# Patient Record
Sex: FEMALE | Race: WHITE | NOT HISPANIC OR LATINO | Employment: FULL TIME | URBAN - NONMETROPOLITAN AREA
[De-identification: names, ages, dates, MRNs, and addresses within clinical notes are randomized per-mention and may not be internally consistent; named-entity substitution may affect disease eponyms.]

---

## 2019-12-07 ENCOUNTER — HOSPITAL ENCOUNTER (EMERGENCY)
Facility: HOSPITAL | Age: 69
Discharge: HOME | End: 2019-12-07
Attending: EMERGENCY MEDICINE | Admitting: EMERGENCY MEDICINE
Payer: COMMERCIAL

## 2019-12-07 VITALS
DIASTOLIC BLOOD PRESSURE: 62 MMHG | HEART RATE: 72 BPM | SYSTOLIC BLOOD PRESSURE: 132 MMHG | RESPIRATION RATE: 20 BRPM | BODY MASS INDEX: 21.71 KG/M2 | OXYGEN SATURATION: 97 % | WEIGHT: 118 LBS | HEIGHT: 62 IN | TEMPERATURE: 98.6 F

## 2019-12-07 DIAGNOSIS — K64.9 HEMORRHOIDS, UNSPECIFIED HEMORRHOID TYPE: Primary | ICD-10-CM

## 2019-12-07 LAB
ALBUMIN SERPL BCP-MCNC: 3.8 G/DL (ref 3.5–5)
ALP SERPL-CCNC: 80 U/L (ref 46–116)
ALT SERPL W P-5'-P-CCNC: 29 U/L (ref 12–78)
ANION GAP SERPL CALCULATED.3IONS-SCNC: 6 MMOL/L (ref 4–13)
APTT PPP: 26 SECONDS (ref 23–37)
AST SERPL W P-5'-P-CCNC: 24 U/L (ref 5–45)
BASOPHILS # BLD AUTO: 0.03 THOUSANDS/ΜL (ref 0–0.1)
BASOPHILS NFR BLD AUTO: 1 % (ref 0–1)
BILIRUB SERPL-MCNC: 0.4 MG/DL (ref 0.2–1)
BUN SERPL-MCNC: 13 MG/DL (ref 5–25)
CALCIUM SERPL-MCNC: 9.3 MG/DL (ref 8.3–10.1)
CHLORIDE SERPL-SCNC: 104 MMOL/L (ref 100–108)
CO2 SERPL-SCNC: 31 MMOL/L (ref 21–32)
CREAT SERPL-MCNC: 0.58 MG/DL (ref 0.6–1.3)
EOSINOPHIL # BLD AUTO: 0.1 THOUSAND/ΜL (ref 0–0.61)
EOSINOPHIL NFR BLD AUTO: 2 % (ref 0–6)
ERYTHROCYTE [DISTWIDTH] IN BLOOD BY AUTOMATED COUNT: 12.7 % (ref 11.6–15.1)
GFR SERPL CREATININE-BSD FRML MDRD: 95 ML/MIN/1.73SQ M
GLUCOSE SERPL-MCNC: 107 MG/DL (ref 65–140)
HCT VFR BLD AUTO: 44.1 % (ref 34.8–46.1)
HGB BLD-MCNC: 14.4 G/DL (ref 11.5–15.4)
IMM GRANULOCYTES # BLD AUTO: 0.02 THOUSAND/UL (ref 0–0.2)
IMM GRANULOCYTES NFR BLD AUTO: 0 % (ref 0–2)
INR PPP: 0.86 (ref 0.84–1.19)
LYMPHOCYTES # BLD AUTO: 1.71 THOUSANDS/ΜL (ref 0.6–4.47)
LYMPHOCYTES NFR BLD AUTO: 27 % (ref 14–44)
MCH RBC QN AUTO: 31.4 PG (ref 26.8–34.3)
MCHC RBC AUTO-ENTMCNC: 32.7 G/DL (ref 31.4–37.4)
MCV RBC AUTO: 96 FL (ref 82–98)
MONOCYTES # BLD AUTO: 0.58 THOUSAND/ΜL (ref 0.17–1.22)
MONOCYTES NFR BLD AUTO: 9 % (ref 4–12)
NEUTROPHILS # BLD AUTO: 3.85 THOUSANDS/ΜL (ref 1.85–7.62)
NEUTS SEG NFR BLD AUTO: 61 % (ref 43–75)
NRBC BLD AUTO-RTO: 0 /100 WBCS
PLATELET # BLD AUTO: 263 THOUSANDS/UL (ref 149–390)
PMV BLD AUTO: 9.3 FL (ref 8.9–12.7)
POTASSIUM SERPL-SCNC: 5 MMOL/L (ref 3.5–5.3)
PROT SERPL-MCNC: 7.4 G/DL (ref 6.4–8.2)
PROTHROMBIN TIME: 11.7 SECONDS (ref 11.6–14.5)
RBC # BLD AUTO: 4.59 MILLION/UL (ref 3.81–5.12)
SODIUM SERPL-SCNC: 141 MMOL/L (ref 136–145)
WBC # BLD AUTO: 6.29 THOUSAND/UL (ref 4.31–10.16)

## 2019-12-07 PROCEDURE — 36415 COLL VENOUS BLD VENIPUNCTURE: CPT | Performed by: EMERGENCY MEDICINE

## 2019-12-07 PROCEDURE — 80053 COMPREHEN METABOLIC PANEL: CPT | Performed by: EMERGENCY MEDICINE

## 2019-12-07 PROCEDURE — 85025 COMPLETE CBC W/AUTO DIFF WBC: CPT | Performed by: EMERGENCY MEDICINE

## 2019-12-07 PROCEDURE — 85610 PROTHROMBIN TIME: CPT | Performed by: EMERGENCY MEDICINE

## 2019-12-07 PROCEDURE — 99283 EMERGENCY DEPT VISIT LOW MDM: CPT

## 2019-12-07 PROCEDURE — 99284 EMERGENCY DEPT VISIT MOD MDM: CPT | Performed by: EMERGENCY MEDICINE

## 2019-12-07 PROCEDURE — 85730 THROMBOPLASTIN TIME PARTIAL: CPT | Performed by: EMERGENCY MEDICINE

## 2019-12-07 NOTE — ED PROVIDER NOTES
History  Chief Complaint   Patient presents with    Rectal Bleeding - Minor     pt is with blood in stool  pt states she has a chronic problem with blood in the stool  pt states she has a history of hemmoroids and having abd problems  pt states she feels pressure still at the base of her colon     Patient complains of rectal bleeding x2 today  Feels pressure in her rectal area  Does have a history of hemorrhoids  No fevers or chills  States her stomach has been upset since Thanksgiving and has been on a light diet  Stools are slightly soft now  No recent travel  No recent antibiotics  No weakness or lightheadedness  No abdominal pain  History provided by:  Patient   used: No    Rectal Bleeding - Minor   Quality:  Bright red  Amount: Moderate  Duration: this morning for 2 episodes  Chronicity:  Recurrent  Context: hemorrhoids    Similar prior episodes: yes    Relieved by:  Nothing  Worsened by:  Nothing  Ineffective treatments:  None tried  Associated symptoms: no abdominal pain, no dizziness, no fever, no recent illness and no vomiting    Risk factors: no anticoagulant use        None       Past Medical History:   Diagnosis Date    Arthritis     GERD (gastroesophageal reflux disease)     Psychiatric disorder     anxiety       History reviewed  No pertinent surgical history  History reviewed  No pertinent family history  I have reviewed and agree with the history as documented  Social History     Tobacco Use    Smoking status: Smoker, Current Status Unknown    Smokeless tobacco: Never Used   Substance Use Topics    Alcohol use: Not Currently    Drug use: Never        Review of Systems   Constitutional: Negative for chills and fever  HENT: Negative for ear pain, hearing loss, sore throat, trouble swallowing and voice change  Eyes: Negative for pain and discharge  Respiratory: Negative for cough, shortness of breath and wheezing      Cardiovascular: Negative for chest pain and palpitations  Gastrointestinal: Positive for blood in stool and hematochezia  Negative for abdominal pain, constipation, diarrhea, nausea and vomiting  Genitourinary: Negative for dysuria, flank pain, frequency and hematuria  Musculoskeletal: Negative for joint swelling, neck pain and neck stiffness  Skin: Negative for rash and wound  Neurological: Negative for dizziness, seizures, syncope, facial asymmetry and headaches  Psychiatric/Behavioral: Negative for hallucinations, self-injury and suicidal ideas  All other systems reviewed and are negative  Physical Exam  Physical Exam   Constitutional: She is oriented to person, place, and time  She appears well-developed and well-nourished  No distress  HENT:   Head: Normocephalic and atraumatic  Right Ear: External ear normal    Left Ear: External ear normal    Eyes: Pupils are equal, round, and reactive to light  Conjunctivae and EOM are normal    Neck: Normal range of motion  Neck supple  Cardiovascular: Normal rate, regular rhythm and normal heart sounds  No murmur heard  Pulmonary/Chest: Effort normal and breath sounds normal  She has no wheezes  She has no rales  Abdominal: Soft  Bowel sounds are normal  She exhibits no distension  There is no tenderness  There is no rebound and no guarding  On rectal exam there are 2 enlarged hemorrhoids  One hemorrhoid is inflamed and tender to palpation with fresh blood on it  Heme-positive  No stool in the vault  Musculoskeletal: Normal range of motion  She exhibits no deformity  Neurological: She is alert and oriented to person, place, and time  No cranial nerve deficit  Skin: Skin is warm and dry  No rash noted  Psychiatric: She has a normal mood and affect  Her behavior is normal    Nursing note and vitals reviewed        Vital Signs  ED Triage Vitals [12/07/19 0917]   Temperature Pulse Respirations Blood Pressure SpO2   98 6 °F (37 °C) 82 18 (!) 191/77 98 %      Temp Source Heart Rate Source Patient Position - Orthostatic VS BP Location FiO2 (%)   Temporal Monitor Lying Left arm --      Pain Score       3           Vitals:    12/07/19 0917 12/07/19 0939   BP: (!) 191/77 134/61   Pulse: 82    Patient Position - Orthostatic VS: Lying          Visual Acuity      ED Medications  Medications - No data to display    Diagnostic Studies  Results Reviewed     Procedure Component Value Units Date/Time    Comprehensive metabolic panel [[id number]]  (Abnormal) Collected:  12/07/19 0937    Lab Status:  Final result Specimen:  Blood from Arm, Right Updated:  12/07/19 1004     Sodium 141 mmol/L      Potassium 5 0 mmol/L      Chloride 104 mmol/L      CO2 31 mmol/L      ANION GAP 6 mmol/L      BUN 13 mg/dL      Creatinine 0 58 mg/dL      Glucose 107 mg/dL      Calcium 9 3 mg/dL      AST 24 U/L      ALT 29 U/L      Alkaline Phosphatase 80 U/L      Total Protein 7 4 g/dL      Albumin 3 8 g/dL      Total Bilirubin 0 40 mg/dL      eGFR 95 ml/min/1 73sq m     Narrative:       [place] guidelines for Chronic Kidney Disease (CKD):     Stage 1 with normal or high GFR (GFR > 90 mL/min/1 73 square meters)    Stage 2 Mild CKD (GFR = 60-89 mL/min/1 73 square meters)    Stage 3A Moderate CKD (GFR = 45-59 mL/min/1 73 square meters)    Stage 3B Moderate CKD (GFR = 30-44 mL/min/1 73 square meters)    Stage 4 Severe CKD (GFR = 15-29 mL/min/1 73 square meters)    Stage 5 End Stage CKD (GFR <15 mL/min/1 73 square meters)  Note: GFR calculation is accurate only with a steady state creatinine    Protime-INR [[id number]]  (Normal) Collected:  12/07/19 0937    Lab Status:  Final result Specimen:  Blood from Arm, Right Updated:  12/07/19 0954     Protime 11 7 seconds      INR 0 86    APTT [[id number]]  (Normal) Collected:  12/07/19 0937    Lab Status:  Final result Specimen:  Blood from Arm, Right Updated:  12/07/19 0954     PTT 26 seconds     CBC and differential [[id number]] Collected: 12/07/19 0937    Lab Status:  Final result Specimen:  Blood from Arm, Right Updated:  12/07/19 0942     WBC 6 29 Thousand/uL      RBC 4 59 Million/uL      Hemoglobin 14 4 g/dL      Hematocrit 44 1 %      MCV 96 fL      MCH 31 4 pg      MCHC 32 7 g/dL      RDW 12 7 %      MPV 9 3 fL      Platelets 135 Thousands/uL      nRBC 0 /100 WBCs      Neutrophils Relative 61 %      Immat GRANS % 0 %      Lymphocytes Relative 27 %      Monocytes Relative 9 %      Eosinophils Relative 2 %      Basophils Relative 1 %      Neutrophils Absolute 3 85 Thousands/µL      Immature Grans Absolute 0 02 Thousand/uL      Lymphocytes Absolute 1 71 Thousands/µL      Monocytes Absolute 0 58 Thousand/µL      Eosinophils Absolute 0 10 Thousand/µL      Basophils Absolute 0 03 Thousands/µL                  No orders to display              Procedures  Procedures         ED Course                               MDM      Disposition  Final diagnoses:   Hemorrhoids, unspecified hemorrhoid type     Time reflects when diagnosis was documented in both MDM as applicable and the Disposition within this note     Time User Action Codes Description Comment    12/7/2019  9:34 AM Felicity Voss Add [K64 9] Hemorrhoids, unspecified hemorrhoid type       ED Disposition     ED Disposition Condition Date/Time Comment    Discharge Stable Sat Dec 7, 2019 10:05 AM Sonny Dong discharge to home/self care              Follow-up Information    None         Patient's Medications   Discharge Prescriptions    HYDROCORTISONE (ANUSOL-HC) 2 5 % RECTAL CREAM    Apply rectally 3 times daily       Start Date: 12/7/2019 End Date: --       Order Dose: --       Quantity: 30 g    Refills: 0         ED Provider  Electronically Signed by           Catalino Pink MD  12/07/19 1006

## 2021-03-09 DIAGNOSIS — Z23 ENCOUNTER FOR IMMUNIZATION: ICD-10-CM

## 2021-03-19 ENCOUNTER — IMMUNIZATIONS (OUTPATIENT)
Dept: FAMILY MEDICINE CLINIC | Facility: HOSPITAL | Age: 71
End: 2021-03-19

## 2021-03-19 DIAGNOSIS — Z23 ENCOUNTER FOR IMMUNIZATION: Primary | ICD-10-CM

## 2021-03-19 PROCEDURE — 91300 SARS-COV-2 / COVID-19 MRNA VACCINE (PFIZER-BIONTECH) 30 MCG: CPT

## 2021-03-19 PROCEDURE — 0001A SARS-COV-2 / COVID-19 MRNA VACCINE (PFIZER-BIONTECH) 30 MCG: CPT

## 2021-04-12 ENCOUNTER — IMMUNIZATIONS (OUTPATIENT)
Dept: FAMILY MEDICINE CLINIC | Facility: HOSPITAL | Age: 71
End: 2021-04-12

## 2021-04-12 DIAGNOSIS — Z23 ENCOUNTER FOR IMMUNIZATION: Primary | ICD-10-CM

## 2021-04-12 PROCEDURE — 0002A SARS-COV-2 / COVID-19 MRNA VACCINE (PFIZER-BIONTECH) 30 MCG: CPT

## 2021-04-12 PROCEDURE — 91300 SARS-COV-2 / COVID-19 MRNA VACCINE (PFIZER-BIONTECH) 30 MCG: CPT

## 2021-07-11 ENCOUNTER — HOSPITAL ENCOUNTER (EMERGENCY)
Facility: HOSPITAL | Age: 71
Discharge: HOME | End: 2021-07-11
Attending: EMERGENCY MEDICINE
Payer: COMMERCIAL

## 2021-07-11 VITALS
OXYGEN SATURATION: 97 % | BODY MASS INDEX: 20.73 KG/M2 | SYSTOLIC BLOOD PRESSURE: 135 MMHG | WEIGHT: 113.32 LBS | TEMPERATURE: 97.8 F | DIASTOLIC BLOOD PRESSURE: 67 MMHG | RESPIRATION RATE: 18 BRPM | HEART RATE: 95 BPM

## 2021-07-11 DIAGNOSIS — S05.01XA ABRASION OF RIGHT CORNEA, INITIAL ENCOUNTER: Primary | ICD-10-CM

## 2021-07-11 PROCEDURE — 99283 EMERGENCY DEPT VISIT LOW MDM: CPT

## 2021-07-11 PROCEDURE — 99284 EMERGENCY DEPT VISIT MOD MDM: CPT | Performed by: EMERGENCY MEDICINE

## 2021-07-11 RX ORDER — CIPROFLOXACIN HYDROCHLORIDE 3.5 MG/ML
1 SOLUTION/ DROPS TOPICAL EVERY 4 HOURS
Qty: 5 ML | Refills: 0 | Status: SHIPPED | OUTPATIENT
Start: 2021-07-11

## 2021-07-11 RX ORDER — POLYVINYL ALCOHOL 14 MG/ML
1 SOLUTION/ DROPS OPHTHALMIC AS NEEDED
COMMUNITY

## 2021-07-11 RX ADMIN — FLUORESCEIN SODIUM 1 STRIP: 1 STRIP OPHTHALMIC at 07:40

## 2021-07-11 NOTE — DISCHARGE INSTRUCTIONS
Although I was not able to visualize any obvious scratches, you may have a small scratch of your cornea  There are no foreign objects in your eye  Your symptoms are consistent with chemical irritation of the eye or small scratch  Please follow-up with 1 of the eye doctors listed below  Use the prescribed antibiotic drops

## 2021-07-11 NOTE — ED PROVIDER NOTES
History  Chief Complaint   Patient presents with    Eye Problem     pt complains of right eye irritation x few days, feels like something in eyes, using OTC drops and had eye burning this morning with drop  Patient complains of 2 days of right eye irritation  She states that she felt like something got in my eye  Denies visual change, fevers, drainage from the eye  States she has been using artificial tear drops and over the past day these have started to burn  History provided by:  Patient   used: No    Eye Problem  Location:  Right eye  Quality:  Burning  Severity:  Mild  Onset quality:  Gradual  Duration:  2 days  Timing:  Constant  Progression:  Unchanged  Chronicity:  New  Context: foreign body    Context: not contact lens problem    Relieved by:  Nothing  Worsened by:  Nothing  Ineffective treatments:  Eye drops  Associated symptoms: no blurred vision, no crusting, no decreased vision, no discharge, no double vision, no headaches, no nausea, no numbness, no photophobia, no redness, no scotomas, no swelling, no tearing, no tingling and no vomiting        Prior to Admission Medications   Prescriptions Last Dose Informant Patient Reported? Taking?   hydrocortisone (ANUSOL-HC) 2 5 % rectal cream Not Taking at Unknown time  No No   Sig: Apply rectally 3 times daily   Patient not taking: Reported on 2021   polyvinyl alcohol (LIQUIFILM TEARS) 1 4 % ophthalmic solution   Yes Yes   Si drop as needed for dry eyes      Facility-Administered Medications: None       Past Medical History:   Diagnosis Date    Arthritis     GERD (gastroesophageal reflux disease)     Psychiatric disorder     anxiety       History reviewed  No pertinent surgical history  History reviewed  No pertinent family history  I have reviewed and agree with the history as documented      E-Cigarette/Vaping     E-Cigarette/Vaping Substances     Social History     Tobacco Use    Smoking status: Current Every Day Smoker     Packs/day: 0 50    Smokeless tobacco: Never Used   Substance Use Topics    Alcohol use: Not Currently    Drug use: Never       Review of Systems   Constitutional: Negative for chills and fever  HENT: Negative for ear pain, hearing loss, sore throat, trouble swallowing and voice change  Eyes: Negative for blurred vision, double vision, photophobia, pain, discharge and redness  Respiratory: Negative for cough, shortness of breath and wheezing  Cardiovascular: Negative for chest pain and palpitations  Gastrointestinal: Negative for abdominal pain, blood in stool, constipation, diarrhea, nausea and vomiting  Genitourinary: Negative for dysuria, flank pain, frequency and hematuria  Musculoskeletal: Negative for joint swelling, neck pain and neck stiffness  Skin: Negative for rash and wound  Neurological: Negative for dizziness, tingling, seizures, syncope, facial asymmetry, numbness and headaches  Psychiatric/Behavioral: Negative for hallucinations, self-injury and suicidal ideas  All other systems reviewed and are negative  Physical Exam  Physical Exam  Vitals and nursing note reviewed  Constitutional:       General: She is not in acute distress  Appearance: She is well-developed  She is not ill-appearing or diaphoretic  HENT:      Head: Normocephalic and atraumatic  Right Ear: External ear normal       Left Ear: External ear normal    Eyes:      General: No scleral icterus  Right eye: No discharge  Left eye: No discharge  Extraocular Movements: Extraocular movements intact  Conjunctiva/sclera: Conjunctivae normal       Pupils: Pupils are equal, round, and reactive to light  Comments: Right eye without obvious injection or chemosis  Pupil is reactive  Extraocular movements are full  Lid iram it and no foreign bodies noted  No corneal or scleral foreign bodies noted    Eye examined with fluorescein staining and Wood's lamp, no corneal abrasions or corneal ulcerations noted  Lazaro sign negative   Pulmonary:      Effort: Pulmonary effort is normal  No respiratory distress  Musculoskeletal:         General: No deformity or signs of injury  Normal range of motion  Cervical back: Normal range of motion and neck supple  Skin:     General: Skin is warm and dry  Coloration: Skin is not jaundiced or pale  Neurological:      General: No focal deficit present  Mental Status: She is alert and oriented to person, place, and time  Cranial Nerves: No cranial nerve deficit  Coordination: Coordination normal       Gait: Gait normal    Psychiatric:         Mood and Affect: Mood normal          Behavior: Behavior normal          Thought Content: Thought content normal          Judgment: Judgment normal          Vital Signs  ED Triage Vitals   Temperature Pulse Respirations Blood Pressure SpO2   07/11/21 0739 07/11/21 0739 07/11/21 0739 07/11/21 0739 07/11/21 0745   97 8 °F (36 6 °C) 95 18 (!) 185/77 97 %      Temp Source Heart Rate Source Patient Position - Orthostatic VS BP Location FiO2 (%)   07/11/21 0739 07/11/21 0739 07/11/21 0739 07/11/21 0739 --   Temporal Monitor Sitting Left arm       Pain Score       07/11/21 0745       7           Vitals:    07/11/21 0739 07/11/21 0745   BP: (!) 185/77 135/67   Pulse: 95    Patient Position - Orthostatic VS: Sitting Sitting         Visual Acuity      ED Medications  Medications   fluorescein sodium sterile ophthalmic strip 1 strip (1 strip Right Eye Given by Other 7/11/21 0740)       Diagnostic Studies  Results Reviewed     None                 No orders to display              Procedures  Procedures         ED Course                             SBIRT 20yo+      Most Recent Value   SBIRT (23 yo +)   In order to provide better care to our patients, we are screening all of our patients for alcohol and drug use  Would it be okay to ask you these screening questions?   Unable to answer at this time Filed at: 07/11/2021 0779                    [facility]  Number of Diagnoses or Management Options  Diagnosis management comments: Possible chemical irritation of the eye versus small nonvisualized abrasion  I advised patient have prescribed antibiotic eyedrops  I advised patient she should follow-up with Ophthalmology and I have provided outpatient referral information  Patient is agreeable  Disposition  Final diagnoses:   Abrasion of right cornea, initial encounter     Time reflects when diagnosis was documented in both MDM as applicable and the Disposition within this note     Time User Action Codes Description Comment    7/11/2021  7:49 AM Tawnya Woodward Add [S05 01XA] Abrasion of right cornea, initial encounter       ED Disposition     ED Disposition Condition Date/Time Comment    Discharge Stable Sun Jul 11, 2021  7:49 AM Sabino Owens discharge to home/self care  Follow-up Information     Follow up With Specialties Details Why Contact Info    [facility]   [address]  [address]    [facility]  [phone]          Patient's Medications   Discharge Prescriptions    CIPROFLOXACIN (CILOXAN) 0 3 % OPHTHALMIC SOLUTION    Administer 1 drop to the right eye every 4 (four) hours       Start Date: 7/11/2021 End Date: --       Order Dose: 1 drop       Quantity: 5 mL    Refills: 0     No discharge procedures on file      PDMP Review     None          ED Provider  Electronically Signed by           Narinder Haro MD  07/11/21 5636

## 2021-07-11 NOTE — ED NOTES
Pt in no acute distress  Ambulates with a steady gait   Verbalizes understanding of discharge instructions       Rosemary oRbbins RN  07/11/21 6997

## 2024-12-27 ENCOUNTER — OFFICE VISIT (OUTPATIENT)
Dept: URGENT CARE | Facility: CLINIC | Age: 74
End: 2024-12-27
Payer: COMMERCIAL

## 2024-12-27 VITALS
TEMPERATURE: 97.2 F | DIASTOLIC BLOOD PRESSURE: 82 MMHG | RESPIRATION RATE: 16 BRPM | OXYGEN SATURATION: 94 % | HEIGHT: 62 IN | BODY MASS INDEX: 20.24 KG/M2 | WEIGHT: 110 LBS | SYSTOLIC BLOOD PRESSURE: 142 MMHG | HEART RATE: 82 BPM

## 2024-12-27 DIAGNOSIS — H57.89 IRRITATION OF RIGHT EYE: Primary | ICD-10-CM

## 2024-12-27 PROCEDURE — 99213 OFFICE O/P EST LOW 20 MIN: CPT

## 2024-12-27 PROCEDURE — S9088 SERVICES PROVIDED IN URGENT: HCPCS

## 2024-12-27 RX ORDER — TETRACAINE HYDROCHLORIDE 5 MG/ML
2 SOLUTION OPHTHALMIC ONCE
Status: COMPLETED | OUTPATIENT
Start: 2024-12-27 | End: 2024-12-27

## 2024-12-27 RX ORDER — LORATADINE 10 MG/1
10 TABLET ORAL DAILY
COMMUNITY
End: 2024-12-27

## 2024-12-27 RX ORDER — ERYTHROMYCIN 5 MG/G
0.5 OINTMENT OPHTHALMIC
Qty: 7 G | Refills: 0 | Status: SHIPPED | OUTPATIENT
Start: 2024-12-27 | End: 2025-01-03

## 2024-12-27 RX ADMIN — TETRACAINE HYDROCHLORIDE 2 DROP: 5 SOLUTION OPHTHALMIC at 10:00

## 2024-12-27 NOTE — PROGRESS NOTES
[facility]        NAME: Velvet Hussein is a 74 y.o. female  : 1950    MRN: [id number]  DATE: 2024  TIME: 11:55 AM    Assessment and Plan   Irritation of right eye [H57.89]  1. Irritation of right eye  tetracaine 0.5 % ophthalmic solution 2 drop    fluorescein sodium sterile 1 mg ophthalmic strip 1 strip    erythromycin (ILOTYCIN) ophthalmic ointment        Use tetracaine, fluorescein stain, and Woods lamp to visualize right eye.  No fluorescein uptake to show any sort of abrasion.  Due to patient having more pain at her lower eyelid, will do erythromycin ointment for possible abrasion not able to be visualized.  Advised to follow-up with eye doctor if not improving.  Patient verbalized understanding.    Patient Instructions     Use eye ointment as prescribed  Warm or cool compress on area as needed  Tylenol as needed  Follow up with PCP in 3-5 days.  Proceed to  ER if symptoms worsen.    If tests are performed, our office will contact you with results only if changes need to made to the care plan discussed with you at the visit. [facility]    Chief Complaint     Chief Complaint   Patient presents with   • Eye Pain     Got something in right eye 2 days ago and has pain in right eye since         History of Present Illness       Patient is presenting for right eye irritation x 2 days.  She stated she felt she got something in her eye and has been hurting since.  She states that the pain is mainly at the outer corner of her lower lid.  She states that the lower eyelid is swollen.  She denies any photophobia, vision changes, or draining from the eye.    Eye Pain   Pertinent negatives include no eye discharge, eye redness, itching or photophobia.       Review of Systems   Review of Systems   Constitutional: Negative.    Eyes:  Positive for pain (lower eyelid of R eye). Negative for photophobia, discharge, redness, itching and visual  "disturbance.   Respiratory: Negative.     Cardiovascular: Negative.          Current Medications       Current Outpatient Medications:   •  erythromycin (ILOTYCIN) ophthalmic ointment, Administer 0.5 inches to the right eye daily at bedtime for 7 days, Disp: 7 g, Rfl: 0  •  hydrocortisone (ANUSOL-HC) 2.5 % rectal cream, Apply rectally 3 times daily (Patient not taking: Reported on 7/11/2021), Disp: 30 g, Rfl: 0  No current facility-administered medications for this visit.    Current Allergies     Allergies as of 12/27/2024 - Reviewed 12/27/2024   Allergen Reaction Noted   • Aspirin  12/07/2019            The following portions of the patient's history were reviewed and updated as appropriate: allergies, current medications, past family history, past medical history, past social history, past surgical history and problem list.     Past Medical History:   Diagnosis Date   • Arthritis    • GERD (gastroesophageal reflux disease)    • Psychiatric disorder     anxiety       Past Surgical History:   Procedure Laterality Date   • OTHER SURGICAL HISTORY      fibroid from left breast       Family History   Problem Relation Age of Onset   • Stroke Mother    • Heart disease Mother    • Stroke Father    • Heart disease Father          Medications have been verified.        Objective   /82   Pulse 82   Temp (!) 97.2 °F (36.2 °C)   Resp 16   Ht 5' 2\" (1.575 m)   Wt 49.9 kg (110 lb)   SpO2 94%   BMI 20.12 kg/m²        Physical Exam     Physical Exam  Constitutional:       Appearance: Normal appearance.   Eyes:      General:         Right eye: No discharge.         Left eye: No discharge.      Extraocular Movements: Extraocular movements intact.      Conjunctiva/sclera:      Right eye: Right conjunctiva is injected.      Left eye: Left conjunctiva is not injected.      Pupils: Pupils are equal, round, and reactive to light.      Right eye: No fluorescein uptake.   Cardiovascular:      Rate and Rhythm: Normal rate.      " Pulses: Normal pulses.   Pulmonary:      Effort: Pulmonary effort is normal.   Neurological:      General: No focal deficit present.      Mental Status: She is alert and oriented to person, place, and time. Mental status is at baseline.

## 2024-12-27 NOTE — PATIENT INSTRUCTIONS
Use eye ointment as prescribed  Warm or cool compress on area as needed  Tylenol as needed  Follow up with PCP in 3-5 days.  Proceed to  ER if symptoms worsen.    If tests are performed, our office will contact you with results only if changes need to made to the care plan discussed with you at the visit. You can review your full results on [facility].